# Patient Record
Sex: MALE | Race: WHITE | NOT HISPANIC OR LATINO | URBAN - METROPOLITAN AREA
[De-identification: names, ages, dates, MRNs, and addresses within clinical notes are randomized per-mention and may not be internally consistent; named-entity substitution may affect disease eponyms.]

---

## 2019-10-30 ENCOUNTER — COMMUNICATION - HEALTHEAST (OUTPATIENT)
Dept: SCHEDULING | Facility: CLINIC | Age: 54
End: 2019-10-30

## 2019-10-30 ENCOUNTER — ANESTHESIA - HEALTHEAST (OUTPATIENT)
Dept: SURGERY | Facility: HOSPITAL | Age: 54
End: 2019-10-30

## 2019-10-30 ENCOUNTER — SURGERY - HEALTHEAST (OUTPATIENT)
Dept: SURGERY | Facility: HOSPITAL | Age: 54
End: 2019-10-30

## 2019-10-30 ASSESSMENT — MIFFLIN-ST. JEOR
SCORE: 2627.36
SCORE: 2527.29
SCORE: 2579.45

## 2019-10-31 ASSESSMENT — MIFFLIN-ST. JEOR: SCORE: 2745.01

## 2019-11-15 ENCOUNTER — OFFICE VISIT - HEALTHEAST (OUTPATIENT)
Dept: SURGERY | Facility: CLINIC | Age: 54
End: 2019-11-15

## 2019-11-15 DIAGNOSIS — K35.31 ACUTE APPENDICITIS WITH LOCALIZED PERITONITIS AND GANGRENE, WITHOUT PERFORATION OR ABSCESS: ICD-10-CM

## 2020-01-28 ENCOUNTER — RECORDS - HEALTHEAST (OUTPATIENT)
Dept: ADMINISTRATIVE | Facility: OTHER | Age: 55
End: 2020-01-28

## 2020-01-30 ENCOUNTER — RECORDS - HEALTHEAST (OUTPATIENT)
Dept: ADMINISTRATIVE | Facility: OTHER | Age: 55
End: 2020-01-30

## 2020-02-10 ENCOUNTER — COMMUNICATION - HEALTHEAST (OUTPATIENT)
Dept: ADMINISTRATIVE | Facility: CLINIC | Age: 55
End: 2020-02-10

## 2020-02-10 ENCOUNTER — RECORDS - HEALTHEAST (OUTPATIENT)
Dept: ADMINISTRATIVE | Facility: OTHER | Age: 55
End: 2020-02-10

## 2021-05-25 ENCOUNTER — RECORDS - HEALTHEAST (OUTPATIENT)
Dept: ADMINISTRATIVE | Facility: CLINIC | Age: 56
End: 2021-05-25

## 2021-05-28 ENCOUNTER — RECORDS - HEALTHEAST (OUTPATIENT)
Dept: ADMINISTRATIVE | Facility: CLINIC | Age: 56
End: 2021-05-28

## 2021-06-02 NOTE — ANESTHESIA CARE TRANSFER NOTE
Last vitals:   Vitals:    10/30/19 1713   BP: 132/76   Pulse: 84   Resp: 24   Temp: 36.1  C (97  F)   SpO2: 98%     Patient's level of consciousness is drowsy but awake. Denies pain. Denies nausea. Taking deep breaths to command.   Spontaneous respirations: yes  Maintains airway independently: yes  Dentition unchanged: yes  Oropharynx: oropharynx clear of all foreign objects    QCDR Measures:  ASA# 20 - Surgical Safety Checklist: WHO surgical safety checklist completed prior to induction    PQRS# 430 - Adult PONV Prevention: 4558F - Pt received => 2 anti-emetic agents (different classes) preop & intraop  ASA# 8 - Peds PONV Prevention: NA - Not pediatric patient, not GA or 2 or more risk factors NOT present  PQRS# 424 - Hayley-op Temp Management: 4559F - At least one body temp DOCUMENTED => 35.5C or 95.9F within required timeframe  PQRS# 426 - PACU Transfer Protocol: - Transfer of care checklist used  ASA# 14 - Acute Post-op Pain: ASA14B - Patient did NOT experience pain >= 7 out of 10

## 2021-06-02 NOTE — ANESTHESIA PREPROCEDURE EVALUATION
Anesthesia Evaluation      No history of anesthetic complications     Airway    Pulmonary    (+) sleep apnea (on bipap),                          Cardiovascular   Exercise tolerance: > or = 4 METS  (+) hypertension, , hypercholesterolemia,      Neuro/Psych    (+) neuromuscular disease (peripheral neuropathy 2/2 DM),  depression,     Endo/Other    (+) diabetes mellitus type 2, obesity (BMI 42),      GI/Hepatic/Renal      Comments: Acute appendicitis s/f lap appy            Dental                         Anesthesia Plan  Planned anesthetic: general endotracheal  GETA - glidescope, succinylcholine   Decadron 4, Zofran 4 for antiemesis  Ketamine 50mg post-induction for opioid sparing analgesia given DION on bipap    ASA 3   Induction: intravenous     Anesthesia plan special considerations: antiemetics,   Post-op plan: Post-op planned interventions: DION precautions

## 2021-06-02 NOTE — TELEPHONE ENCOUNTER
"Patient called complaining of Abdominal pain, being extremely bloated and unable to pass gas. States he can feel it in his back now and is having a hard time laying down.    Rates 7-8/10    Location: front, lower, both sides. \"If you make an arch from left hip to -just above belly button- to right hip and down\" is where the pain is.     Feels like gas but worse.     Started at 7pm  Sudden onset  Constant, staying the same  Nothing makes it better or worse  Nauseated    Last BM 10/29 1pm greenish color and large  Urinating fine  No fever  No difficulty breathing  No vomiting   No diarrhea    Triaged to a disposition of See a Physician within 4 hours. Patient is agreeable. Option is ED. Patient agreeable.     Reason for Disposition    [1] MILD-MODERATE pain AND [2] constant AND [3] present > 2 hours    Protocols used: ABDOMINAL PAIN - MALE-A-AH    Vane Olmedo RN Triage Nurse Advisor    "

## 2021-06-02 NOTE — ANESTHESIA POSTPROCEDURE EVALUATION
Patient: Francisco Burks  APPENDECTOMY, LAPAROSCOPIC  Anesthesia type: general    Patient location: PACU  Last vitals:   Vitals Value Taken Time   /70 10/30/2019  8:30 PM   Temp 36.7  C (98.1  F) 10/30/2019  8:30 PM   Pulse 80 10/30/2019  8:30 PM   Resp 19 10/30/2019  8:30 PM   SpO2 93 % 10/30/2019  8:30 PM     Post vital signs: stable  Level of consciousness: awake and responds to simple questions  Post-anesthesia pain: pain controlled  Post-anesthesia nausea and vomiting: no  Pulmonary: unassisted, return to baseline  Cardiovascular: stable and blood pressure at baseline  Hydration: adequate  Anesthetic events: no    QCDR Measures:  ASA# 11 - Hayley-op Cardiac Arrest: ASA11B - Patient did NOT experience unanticipated cardiac arrest  ASA# 12 - Hayley-op Mortality Rate: ASA12B - Patient did NOT die  ASA# 13 - PACU Re-Intubation Rate: ASA13B - Patient did NOT require a new airway mgmt  ASA# 10 - Composite Anes Safety: ASA10A - No serious adverse event    Additional Notes:

## 2021-06-03 VITALS — WEIGHT: 315 LBS | BODY MASS INDEX: 38.36 KG/M2 | HEIGHT: 76 IN

## 2021-06-03 NOTE — PROGRESS NOTES
HPI: Pt is here for follow up of a laparoscopic appendectomy.  He is doing well. His appetite is good, and bowel function regular.  No fevers or chills. Ambulating without problems.       There were no vitals taken for this visit.      EXAM: This is a  54 y.o. MAN in no distress  GENERAL: Appears well  CHEST clear  CVS S1S2 NSR  ABDOMEN: Soft, non-tender. Removed staples.   EXT: warm, moves without difficulty.        Assessment/Plan:   S/P appy  Doing well  Improving, staples out  No strenuous activity for another week.  Follow up prn      Navin Nelson MD  [facility] Department of Surgery

## 2021-06-06 NOTE — TELEPHONE ENCOUNTER
Need to call for supporting records.    External - [place] [phone]  Referring Provider: Freeman Gipson MD  DX: Neck pain    Ref. Were received on 2/10 @ 10:41 in rheum folder.

## 2021-06-16 PROBLEM — I95.9 HYPOTENSION, UNSPECIFIED HYPOTENSION TYPE: Status: ACTIVE | Noted: 2019-11-01

## 2021-06-16 PROBLEM — K37 APPENDICITIS: Status: ACTIVE | Noted: 2019-10-30

## 2021-06-16 PROBLEM — E11.42 TYPE 2 DIABETES MELLITUS WITH DIABETIC POLYNEUROPATHY, WITH LONG-TERM CURRENT USE OF INSULIN (H): Status: ACTIVE | Noted: 2019-10-30

## 2021-06-16 PROBLEM — E78.49 OTHER HYPERLIPIDEMIA: Status: ACTIVE | Noted: 2019-10-30

## 2021-06-16 PROBLEM — R35.1 BENIGN PROSTATIC HYPERPLASIA WITH NOCTURIA: Status: ACTIVE | Noted: 2019-02-18

## 2021-06-16 PROBLEM — N40.1 BENIGN PROSTATIC HYPERPLASIA WITH NOCTURIA: Status: ACTIVE | Noted: 2019-02-18

## 2021-06-16 PROBLEM — Z79.4 TYPE 2 DIABETES MELLITUS WITH DIABETIC POLYNEUROPATHY, WITH LONG-TERM CURRENT USE OF INSULIN (H): Status: ACTIVE | Noted: 2019-10-30

## 2021-06-16 PROBLEM — F33.1 MODERATE EPISODE OF RECURRENT MAJOR DEPRESSIVE DISORDER (H): Status: ACTIVE | Noted: 2018-04-16

## 2021-06-16 PROBLEM — I10 ESSENTIAL HYPERTENSION: Status: ACTIVE | Noted: 2019-10-30

## 2021-06-16 PROBLEM — R16.1 SPLENOMEGALY: Status: ACTIVE | Noted: 2019-11-01

## 2021-06-16 PROBLEM — K35.80 ACUTE APPENDICITIS: Status: ACTIVE | Noted: 2019-10-30

## 2021-06-16 PROBLEM — K35.209 ACUTE APPENDICITIS WITH GENERALIZED PERITONITIS WITHOUT GANGRENE, UNSPECIFIED WHETHER ABSCESS PRESENT, UNSPECIFIED WHETHER PERFORATION PRESENT: Status: ACTIVE | Noted: 2019-10-30

## 2021-06-16 PROBLEM — D64.9 ANEMIA: Status: ACTIVE | Noted: 2019-11-01

## 2023-02-07 NOTE — TELEPHONE ENCOUNTER
Date: 3/19/2020 Status: [facility]   Time: 9:00 AM Length: 20   Visit Type: CONSULT [[id number]] Copay: $0.00   Provider: Bob Ozuna MBBS          Thyroid -normal   A1C(diabetes) - normal   Vitamin D -low will send replacement   Cholesterol -   - Advised patient to exercisefor at least 30 minutes a day for at least 5 days a week     - Advised patient to follow a low-carbohydrate diet and to eat carbohydrates that are whole grain     - Advised to try to avoid/limit the daily intake of unhealthy fat such as vegetable oil, canola oil, and peanut oil  You can often find these oils within foods such as cookies, cakes, chips  - Advised to increase foods with healthy fats such as avocados, beans, legumes, nuts, tuna, salmon, whole grains